# Patient Record
Sex: FEMALE | Race: WHITE | URBAN - METROPOLITAN AREA
[De-identification: names, ages, dates, MRNs, and addresses within clinical notes are randomized per-mention and may not be internally consistent; named-entity substitution may affect disease eponyms.]

---

## 2022-09-19 ENCOUNTER — WEB ENCOUNTER (OUTPATIENT)
Dept: URBAN - METROPOLITAN AREA CLINIC 96 | Facility: CLINIC | Age: 22
End: 2022-09-19

## 2022-09-19 ENCOUNTER — OFFICE VISIT (OUTPATIENT)
Dept: URBAN - METROPOLITAN AREA CLINIC 96 | Facility: CLINIC | Age: 22
End: 2022-09-19
Payer: COMMERCIAL

## 2022-09-19 VITALS
BODY MASS INDEX: 23.9 KG/M2 | HEART RATE: 89 BPM | SYSTOLIC BLOOD PRESSURE: 120 MMHG | HEIGHT: 64 IN | TEMPERATURE: 98.3 F | WEIGHT: 140 LBS | DIASTOLIC BLOOD PRESSURE: 71 MMHG

## 2022-09-19 DIAGNOSIS — K62.5 RECTAL BLEEDING: ICD-10-CM

## 2022-09-19 DIAGNOSIS — K21.9 GASTROESOPHAGEAL REFLUX DISEASE, UNSPECIFIED WHETHER ESOPHAGITIS PRESENT: ICD-10-CM

## 2022-09-19 DIAGNOSIS — R13.10 ODYNOPHAGIA: ICD-10-CM

## 2022-09-19 DIAGNOSIS — R14.0 BLOATING: ICD-10-CM

## 2022-09-19 DIAGNOSIS — K59.00 CONSTIPATION, UNSPECIFIED CONSTIPATION TYPE: ICD-10-CM

## 2022-09-19 DIAGNOSIS — R10.13 DYSPEPSIA: ICD-10-CM

## 2022-09-19 PROCEDURE — 99204 OFFICE O/P NEW MOD 45 MIN: CPT | Performed by: INTERNAL MEDICINE

## 2022-09-19 RX ORDER — ATENOLOL 25 MG/1
1 TABLET TABLET ORAL ONCE A DAY
Status: ACTIVE | COMMUNITY

## 2022-09-19 RX ORDER — TRAZODONE HYDROCHLORIDE 50 MG/1
1 TABLET AT BEDTIME AS NEEDED TABLET ORAL ONCE A DAY
Status: ACTIVE | COMMUNITY

## 2022-09-19 RX ORDER — NORTRIPTYLINE HYDROCHLORIDE 25 MG/1
1 CAPSULE CAPSULE ORAL TWICE A DAY
Status: ACTIVE | COMMUNITY

## 2022-09-19 RX ORDER — CELECOXIB 50 MG/1
1 CAPSULE WITH FOOD CAPSULE ORAL TWICE A DAY
Status: ACTIVE | COMMUNITY

## 2022-09-19 RX ORDER — OMEPRAZOLE 40 MG/1
1 CAPSULE 30 MINUTES BEFORE MORNING MEAL CAPSULE, DELAYED RELEASE ORAL ONCE A DAY
Qty: 90 | Refills: 1 | OUTPATIENT
Start: 2022-09-19

## 2022-09-19 RX ORDER — ESCITALOPRAM OXALATE 20 MG/1
1 TABLET TABLET, FILM COATED ORAL ONCE A DAY
Status: ACTIVE | COMMUNITY

## 2022-09-19 RX ORDER — PROPRANOLOL HYDROCHLORIDE 10 MG/1
1 TABLET TABLET ORAL ONCE A DAY
Status: ACTIVE | COMMUNITY

## 2022-09-19 RX ORDER — MIDODRINE HYDROCHLORIDE 2.5 MG/1
1 TABLET TABLET ORAL TWICE A DAY
Status: ACTIVE | COMMUNITY

## 2022-09-19 NOTE — PHYSICAL EXAM CONSTITUTIONAL:
well developed, well nourished , in no acute distress , ambulating with canes , normal communication ability

## 2022-09-19 NOTE — PHYSICAL EXAM HENT:
Head, normocephalic, atraumatic, Face, Face within normal limits, Ears, External ears within normal limits, good neck flexion and extension

## 2022-09-19 NOTE — HPI-TODAY'S VISIT:
23 yo female self referred for multiple GI sx including GERD and odynophagia.   Ehlors-Danlos, POTS, arthritis, cervical instability and occiptal neuralgia with future neck surgery being planned 10/5/2022. No prior EGD. No hx of PUD. No ASA or NSAIDs. No n/v. No CP or SOB.   Followed by rheumatologist. Patient reports GERD was diagnosed by her rheumatologist treated with Gaviscon--prn typically once weekly.  Does report odynophagia for the past 6 months.  Cannot eat cranberries--tongue will swell. No diet restrictions.   Not taking any other meds for GERD.   Also will have intermittent constipation. Will take Miralax prn perhaps 2-3 times monthly which will help. Had one episode of rectal bleeding 1 month ago. No further episodes. Also told she may have endometriosis, no exp lap was done. On empiric hormone therapy which has helped.  Baseline constipation with BM every 3 days.   Followed by therapist and psychiatrist for anxiety/depression. Suspected PTSD and ADHD per patient. No SI or HI.   Also being evaluated by immunology for immune deficiency.   Did receive COVID vaccine.

## 2022-09-28 ENCOUNTER — OFFICE VISIT (OUTPATIENT)
Dept: URBAN - METROPOLITAN AREA MEDICAL CENTER 28 | Facility: MEDICAL CENTER | Age: 22
End: 2022-09-28
Payer: COMMERCIAL

## 2022-09-28 DIAGNOSIS — R10.13 ABDOMINAL DISCOMFORT, EPIGASTRIC: ICD-10-CM

## 2022-09-28 DIAGNOSIS — K29.60 ADENOPAPILLOMATOSIS GASTRICA: ICD-10-CM

## 2022-09-28 DIAGNOSIS — K20.80 ESOPHAGITIS DISSECANS SUPERFICIALIS: ICD-10-CM

## 2022-09-28 DIAGNOSIS — K31.89 ACQUIRED DEFORMITY OF DUODENUM: ICD-10-CM

## 2022-09-28 PROCEDURE — 43239 EGD BIOPSY SINGLE/MULTIPLE: CPT | Performed by: INTERNAL MEDICINE

## 2022-10-12 ENCOUNTER — OFFICE VISIT (OUTPATIENT)
Dept: URBAN - METROPOLITAN AREA CLINIC 98 | Facility: CLINIC | Age: 22
End: 2022-10-12

## 2022-10-12 ENCOUNTER — TELEPHONE ENCOUNTER (OUTPATIENT)
Dept: URBAN - METROPOLITAN AREA CLINIC 96 | Facility: CLINIC | Age: 22
End: 2022-10-12

## 2022-10-12 RX ORDER — TRAZODONE HYDROCHLORIDE 50 MG/1
1 TABLET AT BEDTIME AS NEEDED TABLET ORAL ONCE A DAY
COMMUNITY

## 2022-10-12 RX ORDER — CELECOXIB 50 MG/1
1 CAPSULE WITH FOOD CAPSULE ORAL TWICE A DAY
COMMUNITY

## 2022-10-12 RX ORDER — MIDODRINE HYDROCHLORIDE 2.5 MG/1
1 TABLET TABLET ORAL TWICE A DAY
COMMUNITY

## 2022-10-12 RX ORDER — OMEPRAZOLE 40 MG/1
1 CAPSULE 30 MINUTES BEFORE MORNING MEAL CAPSULE, DELAYED RELEASE ORAL ONCE A DAY
Qty: 90 | Refills: 1 | COMMUNITY
Start: 2022-09-19

## 2022-10-12 RX ORDER — ATENOLOL 25 MG/1
1 TABLET TABLET ORAL ONCE A DAY
COMMUNITY

## 2022-10-12 RX ORDER — PROPRANOLOL HYDROCHLORIDE 10 MG/1
1 TABLET TABLET ORAL ONCE A DAY
COMMUNITY

## 2022-10-12 RX ORDER — ESCITALOPRAM OXALATE 20 MG/1
1 TABLET TABLET, FILM COATED ORAL ONCE A DAY
COMMUNITY

## 2022-10-12 RX ORDER — NORTRIPTYLINE HYDROCHLORIDE 25 MG/1
1 CAPSULE CAPSULE ORAL TWICE A DAY
COMMUNITY

## 2022-10-12 NOTE — HPI-TODAY'S VISIT:
Patient is a 22-year-old female who presents to follow-up from recent EGD.  EGD completed on 9/28/2022 with Dr. RODRIGUEZ at [facility].  Revealed normal third portion of the duodenum.  Normal duodenal bulb, first portion of the duodenum and second portion of the duodenum.  Erythematous mucosa in the stomach.  Normal lower third of the esophagus.  Pathology results revealed duodenal mucosa with normal limits.  Mild chronic inactive gastritis with no H. pylori from stomach biopsy.  Mild patchy chronic inflammation and reactive changes in the lower esophagus.  No evidence of eosinophilic esophagitis and negative for intestinal metaplasia or dysplasia.

## 2022-11-01 ENCOUNTER — OFFICE VISIT (OUTPATIENT)
Dept: URBAN - METROPOLITAN AREA CLINIC 96 | Facility: CLINIC | Age: 22
End: 2022-11-01

## 2022-11-10 ENCOUNTER — TELEPHONE ENCOUNTER (OUTPATIENT)
Dept: URBAN - METROPOLITAN AREA CLINIC 6 | Facility: CLINIC | Age: 22
End: 2022-11-10

## 2023-01-11 ENCOUNTER — WEB ENCOUNTER (OUTPATIENT)
Dept: URBAN - METROPOLITAN AREA CLINIC 96 | Facility: CLINIC | Age: 23
End: 2023-01-11

## 2023-01-11 ENCOUNTER — OFFICE VISIT (OUTPATIENT)
Dept: URBAN - METROPOLITAN AREA CLINIC 96 | Facility: CLINIC | Age: 23
End: 2023-01-11
Payer: COMMERCIAL

## 2023-01-11 VITALS
HEART RATE: 75 BPM | TEMPERATURE: 98.4 F | WEIGHT: 149 LBS | SYSTOLIC BLOOD PRESSURE: 105 MMHG | HEIGHT: 64 IN | BODY MASS INDEX: 25.44 KG/M2 | DIASTOLIC BLOOD PRESSURE: 76 MMHG

## 2023-01-11 DIAGNOSIS — R11.2 NAUSEA AND VOMITING, UNSPECIFIED VOMITING TYPE: ICD-10-CM

## 2023-01-11 DIAGNOSIS — K59.00 CONSTIPATION, UNSPECIFIED CONSTIPATION TYPE: ICD-10-CM

## 2023-01-11 DIAGNOSIS — K21.9 GASTROESOPHAGEAL REFLUX DISEASE, UNSPECIFIED WHETHER ESOPHAGITIS PRESENT: ICD-10-CM

## 2023-01-11 DIAGNOSIS — R13.10 ODYNOPHAGIA: ICD-10-CM

## 2023-01-11 DIAGNOSIS — R10.13 DYSPEPSIA: ICD-10-CM

## 2023-01-11 PROBLEM — 14760008: Status: ACTIVE | Noted: 2022-09-19

## 2023-01-11 PROBLEM — 235595009: Status: ACTIVE | Noted: 2022-09-19

## 2023-01-11 PROBLEM — 30233002: Status: ACTIVE | Noted: 2022-09-19

## 2023-01-11 PROCEDURE — 99214 OFFICE O/P EST MOD 30 MIN: CPT | Performed by: INTERNAL MEDICINE

## 2023-01-11 RX ORDER — MIDODRINE HYDROCHLORIDE 2.5 MG/1
1 TABLET TABLET ORAL TWICE A DAY
Status: ACTIVE | COMMUNITY

## 2023-01-11 RX ORDER — CELECOXIB 50 MG/1
1 CAPSULE WITH FOOD CAPSULE ORAL TWICE A DAY
Status: ACTIVE | COMMUNITY

## 2023-01-11 RX ORDER — NORTRIPTYLINE HYDROCHLORIDE 25 MG/1
1 CAPSULE CAPSULE ORAL TWICE A DAY
Status: ACTIVE | COMMUNITY

## 2023-01-11 RX ORDER — TRAZODONE HYDROCHLORIDE 50 MG/1
1 TABLET AT BEDTIME AS NEEDED TABLET ORAL ONCE A DAY
Status: ACTIVE | COMMUNITY

## 2023-01-11 RX ORDER — PROPRANOLOL HYDROCHLORIDE 10 MG/1
1 TABLET TABLET ORAL ONCE A DAY
Status: ACTIVE | COMMUNITY

## 2023-01-11 RX ORDER — OMEPRAZOLE 40 MG/1
1 CAPSULE 30 MINUTES BEFORE MORNING MEAL CAPSULE, DELAYED RELEASE ORAL ONCE A DAY
Qty: 90 | Refills: 1 | Status: ACTIVE | COMMUNITY
Start: 2022-09-19

## 2023-01-11 RX ORDER — ESCITALOPRAM OXALATE 20 MG/1
1 TABLET TABLET, FILM COATED ORAL ONCE A DAY
Status: ACTIVE | COMMUNITY

## 2023-01-11 RX ORDER — ATENOLOL 25 MG/1
1 TABLET TABLET ORAL ONCE A DAY
Status: ACTIVE | COMMUNITY

## 2023-01-11 NOTE — HPI-TODAY'S VISIT:
EGD done 9/28/2022 at [facility] with regular Z-line at 36 cm, gastric erythema.  Pathology with normal duodenal biopsies, gastric biopsies with mild chronic inactive gastritis negative for H. pylori.  Lower esophageal biopsies with mild chronic inflammation.  No intestinal metaplasia or eosinophilic esophagitis.  Patient reports was diagnosed with gluten sensitivity due to gluten in her allergy pill. Overall mych better.   Mast cell activation syndrome, diagnosed by her cardiologist who specializes in POTS. Treated with antihistamine and GI sx have markedly imroved with less nausea and less heartburn. No dysphagia, no odynophagia. Taking Allegra and Pepcid 20 mg BID. Still taking omeprazole 20 mg daily.   Still with lower abdominal pain, concern for lower abdominal pain and on treatment with OCP which has worsened pain.

## 2023-01-11 NOTE — HPI-TODAY'S VISIT:
23 yo female seen 9/19/2022 for multiple GI sx including GERD and odynophagia.   Ehlors-Danlos, POTS, arthritis, cervical instability and occiptal neuralgia with future neck surgery being planned 10/5/2022. No prior EGD. No hx of PUD. No ASA or NSAIDs. No n/v. No CP or SOB.   Followed by rheumatologist. Patient reports GERD was diagnosed by her rheumatologist treated with Gaviscon--prn typically once weekly.  Does report odynophagia for the past 6 months.  Cannot eat cranberries--tongue will swell. No diet restrictions.  Also will have intermittent constipation. Will take Miralax prn perhaps 2-3 times monthly which will help. Had one episode of rectal bleeding 1 month ago. No further episodes. Also told she may have endometriosis, no exp lap was done. On empiric hormone therapy which has helped.  Baseline constipation with BM every 3 days.   Followed by therapist and psychiatrist for anxiety/depression. Suspected PTSD and ADHD per patient. No SI or HI.   Also being evaluated by immunology for immune deficiency.

## 2023-01-11 NOTE — PHYSICAL EXAM CONSTITUTIONAL:
well developed, well nourished , in no acute distress , ambulating with canes , normal communication ability Patient

## 2023-01-13 NOTE — PHYSICAL EXAM GASTROINTESTINAL
Abdomen , soft, nontender, nondistended , no guarding or rigidity , no masses palpable , normal bowel sounds , Liver and Spleen,  no hepatosplenomegaly , liver nontender Detail Level: Detailed

## 2023-03-30 ENCOUNTER — WEB ENCOUNTER (OUTPATIENT)
Dept: URBAN - METROPOLITAN AREA CLINIC 96 | Facility: CLINIC | Age: 23
End: 2023-03-30

## 2023-04-26 ENCOUNTER — OFFICE VISIT (OUTPATIENT)
Dept: URBAN - METROPOLITAN AREA CLINIC 96 | Facility: CLINIC | Age: 23
End: 2023-04-26
Payer: COMMERCIAL

## 2023-04-26 ENCOUNTER — LAB OUTSIDE AN ENCOUNTER (OUTPATIENT)
Dept: URBAN - METROPOLITAN AREA CLINIC 96 | Facility: CLINIC | Age: 23
End: 2023-04-26

## 2023-04-26 VITALS
WEIGHT: 153 LBS | SYSTOLIC BLOOD PRESSURE: 109 MMHG | HEIGHT: 64 IN | TEMPERATURE: 98.2 F | DIASTOLIC BLOOD PRESSURE: 80 MMHG | BODY MASS INDEX: 26.12 KG/M2

## 2023-04-26 DIAGNOSIS — R13.10 ODYNOPHAGIA: ICD-10-CM

## 2023-04-26 DIAGNOSIS — R11.2 NAUSEA AND VOMITING, UNSPECIFIED VOMITING TYPE: ICD-10-CM

## 2023-04-26 DIAGNOSIS — K59.00 CONSTIPATION, UNSPECIFIED CONSTIPATION TYPE: ICD-10-CM

## 2023-04-26 DIAGNOSIS — R10.13 DYSPEPSIA: ICD-10-CM

## 2023-04-26 DIAGNOSIS — K21.9 GASTROESOPHAGEAL REFLUX DISEASE, UNSPECIFIED WHETHER ESOPHAGITIS PRESENT: ICD-10-CM

## 2023-04-26 DIAGNOSIS — R68.81 EARLY SATIETY: ICD-10-CM

## 2023-04-26 DIAGNOSIS — R14.0 BLOATING: ICD-10-CM

## 2023-04-26 PROCEDURE — 99214 OFFICE O/P EST MOD 30 MIN: CPT | Performed by: INTERNAL MEDICINE

## 2023-04-26 RX ORDER — TRAZODONE HYDROCHLORIDE 50 MG/1
1 TABLET AT BEDTIME AS NEEDED TABLET ORAL ONCE A DAY
Status: ACTIVE | COMMUNITY

## 2023-04-26 RX ORDER — NORTRIPTYLINE HYDROCHLORIDE 25 MG/1
1 CAPSULE CAPSULE ORAL TWICE A DAY
Status: ACTIVE | COMMUNITY

## 2023-04-26 RX ORDER — PROPRANOLOL HYDROCHLORIDE 10 MG/1
1 TABLET TABLET ORAL ONCE A DAY
Status: ACTIVE | COMMUNITY

## 2023-04-26 RX ORDER — OMEPRAZOLE 40 MG/1
1 CAPSULE 30 MINUTES BEFORE MORNING MEAL CAPSULE, DELAYED RELEASE ORAL ONCE A DAY
Qty: 90 | Refills: 1 | Status: ACTIVE | COMMUNITY
Start: 2022-09-19

## 2023-04-26 RX ORDER — ATENOLOL 25 MG/1
1 TABLET TABLET ORAL ONCE A DAY
Status: ACTIVE | COMMUNITY

## 2023-04-26 RX ORDER — CELECOXIB 50 MG/1
1 CAPSULE WITH FOOD CAPSULE ORAL TWICE A DAY
Status: ACTIVE | COMMUNITY

## 2023-04-26 RX ORDER — ESCITALOPRAM OXALATE 20 MG/1
1 TABLET TABLET, FILM COATED ORAL ONCE A DAY
Status: ACTIVE | COMMUNITY

## 2023-04-26 RX ORDER — MIDODRINE HYDROCHLORIDE 2.5 MG/1
1 TABLET TABLET ORAL TWICE A DAY
Status: ACTIVE | COMMUNITY

## 2023-04-26 NOTE — HPI-TODAY'S VISIT:
21 yo female seen 1/11/2023 for multiple GI sx including GERD and odynophagia.   Ehlors-Danlos, POTS, arthritis, cervical instability and occiptal neuralgia with future neck surgery being planned 10/5/2022. No prior EGD. No hx of PUD. No ASA or NSAIDs. No n/v. No CP or SOB.   Followed by rheumatologist. Patient reports GERD was diagnosed by her rheumatologist treated with Gaviscon--prn typically once weekly.    Intermittent constipation. Will take Miralax prn perhaps 2-3 times monthly which will help. Had one episode of rectal bleeding 1 month ago. No further episodes. Also told she may have endometriosis, no exp lap was done. On empiric hormone therapy which has helped.  Baseline constipation with BM every 3 days.   Followed by therapist and psychiatrist for anxiety/depression. Suspected PTSD and ADHD per patient. No SI or HI.   Also being evaluated by immunology for immune deficiency.

## 2023-04-26 NOTE — HPI-TODAY'S VISIT:
EGD done 9/28/2022 at [facility] with regular Z-line at 36 cm, gastric erythema.  Pathology with normal duodenal biopsies, gastric biopsies with mild chronic inactive gastritis negative for H. pylori.  Lower esophageal biopsies with mild chronic inflammation.  No intestinal metaplasia or eosinophilic esophagitis.  Patient reports was diagnosed with gluten sensitivity due to gluten in her allergy pill. Overall much better.   Mast cell activation syndrome, diagnosed by her cardiologist who specializes in POTS. Treated with antihistamine and GI sx have markedly imroved with less nausea and less heartburn. No dysphagia, no odynophagia. Taking Allegra and Pepcid 20 mg BID. Still taking omeprazole 20 mg daily.   Still with lower abdominal pain, concern for lower abdominal pain and on treatment with OCP which has worsened pain.  Patient reports another episode of vomiting requiring IV fluids one month ago. No hematemesis, no coffee ground emesis. After ate oatmeal. CT was done at [facility] normal per patient. To see interventional radiologist for "iliac vein collapse". Now allergic for gluten, to have future testing to eval for such.  Reports recent stressors which worsened constipation and better with Miralax.   No prior gastric emptying study. Does report early satiety worse with constipation. Nausea with eating. Taking Zofran regularly.

## 2023-04-28 ENCOUNTER — OFFICE VISIT (OUTPATIENT)
Dept: URBAN - METROPOLITAN AREA CLINIC 96 | Facility: CLINIC | Age: 23
End: 2023-04-28

## 2023-04-30 ENCOUNTER — WEB ENCOUNTER (OUTPATIENT)
Dept: URBAN - METROPOLITAN AREA CLINIC 96 | Facility: CLINIC | Age: 23
End: 2023-04-30

## 2023-05-03 ENCOUNTER — WEB ENCOUNTER (OUTPATIENT)
Dept: URBAN - METROPOLITAN AREA CLINIC 96 | Facility: CLINIC | Age: 23
End: 2023-05-03

## 2023-05-15 ENCOUNTER — LAB OUTSIDE AN ENCOUNTER (OUTPATIENT)
Dept: URBAN - METROPOLITAN AREA CLINIC 96 | Facility: CLINIC | Age: 23
End: 2023-05-15

## 2023-06-27 ENCOUNTER — OFFICE VISIT (OUTPATIENT)
Dept: URBAN - METROPOLITAN AREA CLINIC 96 | Facility: CLINIC | Age: 23
End: 2023-06-27
Payer: COMMERCIAL

## 2023-06-27 ENCOUNTER — LAB OUTSIDE AN ENCOUNTER (OUTPATIENT)
Dept: URBAN - METROPOLITAN AREA CLINIC 96 | Facility: CLINIC | Age: 23
End: 2023-06-27

## 2023-06-27 VITALS
SYSTOLIC BLOOD PRESSURE: 103 MMHG | TEMPERATURE: 97.8 F | HEIGHT: 64 IN | WEIGHT: 158 LBS | DIASTOLIC BLOOD PRESSURE: 71 MMHG | BODY MASS INDEX: 26.98 KG/M2

## 2023-06-27 DIAGNOSIS — R10.13 DYSPEPSIA: ICD-10-CM

## 2023-06-27 DIAGNOSIS — K62.5 RECTAL BLEEDING: ICD-10-CM

## 2023-06-27 DIAGNOSIS — K21.9 ACID REFLUX: ICD-10-CM

## 2023-06-27 DIAGNOSIS — K59.01 CONSTIPATION: ICD-10-CM

## 2023-06-27 PROCEDURE — 99214 OFFICE O/P EST MOD 30 MIN: CPT | Performed by: INTERNAL MEDICINE

## 2023-06-27 RX ORDER — MIDODRINE HYDROCHLORIDE 2.5 MG/1
1 TABLET TABLET ORAL TWICE A DAY
Status: ACTIVE | COMMUNITY

## 2023-06-27 RX ORDER — NORTRIPTYLINE HYDROCHLORIDE 25 MG/1
1 CAPSULE CAPSULE ORAL TWICE A DAY
Status: ACTIVE | COMMUNITY

## 2023-06-27 RX ORDER — SUCRALFATE 1 G/1
1 TABLET ON AN EMPTY STOMACH TABLET ORAL TWICE A DAY
Qty: 60 | Refills: 3 | OUTPATIENT
Start: 2023-06-27 | End: 2023-10-25

## 2023-06-27 RX ORDER — CELECOXIB 50 MG/1
1 CAPSULE WITH FOOD CAPSULE ORAL TWICE A DAY
Status: ACTIVE | COMMUNITY

## 2023-06-27 RX ORDER — PROPRANOLOL HYDROCHLORIDE 10 MG/1
1 TABLET TABLET ORAL ONCE A DAY
Status: ACTIVE | COMMUNITY

## 2023-06-27 RX ORDER — HYDROCORTISONE ACETATE 25 MG/1
1 SUPPOSITORY SUPPOSITORY RECTAL ONCE A DAY
Qty: 10 | Refills: 0 | OUTPATIENT
Start: 2023-06-27 | End: 2023-07-07

## 2023-06-27 RX ORDER — ATENOLOL 25 MG/1
1 TABLET TABLET ORAL ONCE A DAY
Status: ACTIVE | COMMUNITY

## 2023-06-27 RX ORDER — TRAZODONE HYDROCHLORIDE 50 MG/1
1 TABLET AT BEDTIME AS NEEDED TABLET ORAL ONCE A DAY
Status: ACTIVE | COMMUNITY

## 2023-06-27 RX ORDER — OMEPRAZOLE 40 MG/1
1 CAPSULE 30 MINUTES BEFORE MORNING MEAL CAPSULE, DELAYED RELEASE ORAL ONCE A DAY
Qty: 90 | Refills: 1 | Status: ACTIVE | COMMUNITY
Start: 2022-09-19

## 2023-06-27 RX ORDER — ESCITALOPRAM OXALATE 20 MG/1
1 TABLET TABLET, FILM COATED ORAL ONCE A DAY
Status: ACTIVE | COMMUNITY

## 2023-06-27 RX ORDER — SODIUM, POTASSIUM,MAG SULFATES 17.5-3.13G
177 ML SOLUTION, RECONSTITUTED, ORAL ORAL
Qty: 1 KIT | Refills: 0 | OUTPATIENT
Start: 2023-06-27 | End: 2023-06-28

## 2023-06-27 RX ORDER — ONDANSETRON 4 MG/1
1 TABLET ON THE TONGUE AND ALLOW TO DISSOLVE TABLET, ORALLY DISINTEGRATING ORAL
Qty: 30 | Refills: 1 | OUTPATIENT
Start: 2023-06-27

## 2023-06-27 NOTE — HPI-TODAY'S VISIT:
23 yo female seen 1/11/2023 for multiple GI sx including GERD and odynophagia.   Ehlors-Danlos, POTS, arthritis, cervical instability and occiptal neuralgia with future neck surgery being planned 10/5/2022. No prior EGD. No hx of PUD. No ASA or NSAIDs. No n/v. No CP or SOB.   Followed by rheumatologist. Patient reports GERD was diagnosed by her rheumatologist treated with Gaviscon--prn typically once weekly.    Intermittent constipation. Will take Miralax prn perhaps 2-3 times monthly which will help. Had one episode of rectal bleeding 1 month ago. No further episodes. Also told she may have endometriosis, no exp lap was done. On empiric hormone therapy which has helped.  Baseline constipation with BM every 3 days.   Followed by therapist and psychiatrist for anxiety/depression. Suspected PTSD and ADHD per patient. No SI or HI.   Also being evaluated by immunology for immune deficiency.

## 2023-07-05 ENCOUNTER — TELEPHONE ENCOUNTER (OUTPATIENT)
Dept: URBAN - METROPOLITAN AREA CLINIC 96 | Facility: CLINIC | Age: 23
End: 2023-07-05

## 2023-07-06 ENCOUNTER — LAB OUTSIDE AN ENCOUNTER (OUTPATIENT)
Dept: URBAN - METROPOLITAN AREA CLINIC 96 | Facility: CLINIC | Age: 23
End: 2023-07-06

## 2023-08-14 ENCOUNTER — LAB OUTSIDE AN ENCOUNTER (OUTPATIENT)
Dept: URBAN - METROPOLITAN AREA CLINIC 96 | Facility: CLINIC | Age: 23
End: 2023-08-14

## 2023-08-14 ENCOUNTER — OFFICE VISIT (OUTPATIENT)
Dept: URBAN - METROPOLITAN AREA MEDICAL CENTER 28 | Facility: MEDICAL CENTER | Age: 23
End: 2023-08-14
Payer: COMMERCIAL

## 2023-08-14 DIAGNOSIS — D50.9 ANEMIA: ICD-10-CM

## 2023-08-14 DIAGNOSIS — K59.09 CHANGE IN BOWEL MOVEMENTS INTERMITTENT CONSTIPATION. URGENCY IN THE MORNING.: ICD-10-CM

## 2023-08-14 DIAGNOSIS — K62.5 ANAL BLEEDING: ICD-10-CM

## 2023-08-14 PROCEDURE — 45378 DIAGNOSTIC COLONOSCOPY: CPT | Performed by: INTERNAL MEDICINE

## 2023-08-14 RX ORDER — ONDANSETRON 4 MG/1
1 TABLET ON THE TONGUE AND ALLOW TO DISSOLVE TABLET, ORALLY DISINTEGRATING ORAL
Qty: 30 | Refills: 1 | Status: ACTIVE | COMMUNITY
Start: 2023-06-27

## 2023-08-14 RX ORDER — PROPRANOLOL HYDROCHLORIDE 10 MG/1
1 TABLET TABLET ORAL ONCE A DAY
Status: ACTIVE | COMMUNITY

## 2023-08-14 RX ORDER — ATENOLOL 25 MG/1
1 TABLET TABLET ORAL ONCE A DAY
Status: ACTIVE | COMMUNITY

## 2023-08-14 RX ORDER — TRAZODONE HYDROCHLORIDE 50 MG/1
1 TABLET AT BEDTIME AS NEEDED TABLET ORAL ONCE A DAY
Status: ACTIVE | COMMUNITY

## 2023-08-14 RX ORDER — ESCITALOPRAM OXALATE 20 MG/1
1 TABLET TABLET, FILM COATED ORAL ONCE A DAY
Status: ACTIVE | COMMUNITY

## 2023-08-14 RX ORDER — CELECOXIB 50 MG/1
1 CAPSULE WITH FOOD CAPSULE ORAL TWICE A DAY
Status: ACTIVE | COMMUNITY

## 2023-08-14 RX ORDER — NORTRIPTYLINE HYDROCHLORIDE 25 MG/1
1 CAPSULE CAPSULE ORAL TWICE A DAY
Status: ACTIVE | COMMUNITY

## 2023-08-14 RX ORDER — MIDODRINE HYDROCHLORIDE 2.5 MG/1
1 TABLET TABLET ORAL TWICE A DAY
Status: ACTIVE | COMMUNITY

## 2023-08-14 RX ORDER — SUCRALFATE 1 G/1
1 TABLET ON AN EMPTY STOMACH TABLET ORAL TWICE A DAY
Qty: 60 | Refills: 3 | Status: ACTIVE | COMMUNITY
Start: 2023-06-27 | End: 2023-10-25

## 2023-08-14 RX ORDER — OMEPRAZOLE 40 MG/1
1 CAPSULE 30 MINUTES BEFORE MORNING MEAL CAPSULE, DELAYED RELEASE ORAL ONCE A DAY
Qty: 90 | Refills: 1 | Status: ACTIVE | COMMUNITY
Start: 2022-09-19

## 2023-09-26 ENCOUNTER — WEB ENCOUNTER (OUTPATIENT)
Dept: URBAN - METROPOLITAN AREA CLINIC 96 | Facility: CLINIC | Age: 23
End: 2023-09-26

## 2024-03-12 ENCOUNTER — OV EP (OUTPATIENT)
Dept: URBAN - METROPOLITAN AREA CLINIC 96 | Facility: CLINIC | Age: 24
End: 2024-03-12

## 2024-03-12 RX ORDER — NORTRIPTYLINE HYDROCHLORIDE 25 MG/1
1 CAPSULE CAPSULE ORAL TWICE A DAY
Status: ACTIVE | COMMUNITY

## 2024-03-12 RX ORDER — ONDANSETRON 4 MG/1
1 TABLET ON THE TONGUE AND ALLOW TO DISSOLVE TABLET, ORALLY DISINTEGRATING ORAL
Qty: 30 | Refills: 1 | OUTPATIENT

## 2024-03-12 RX ORDER — ATENOLOL 25 MG/1
1 TABLET TABLET ORAL ONCE A DAY
Status: ACTIVE | COMMUNITY

## 2024-03-12 RX ORDER — OMEPRAZOLE 40 MG/1
1 CAPSULE 30 MINUTES BEFORE MORNING MEAL CAPSULE, DELAYED RELEASE ORAL ONCE A DAY
Qty: 90 | Refills: 1 | Status: ACTIVE | COMMUNITY
Start: 2022-09-19

## 2024-03-12 RX ORDER — ONDANSETRON 4 MG/1
1 TABLET ON THE TONGUE AND ALLOW TO DISSOLVE TABLET, ORALLY DISINTEGRATING ORAL
Qty: 30 | Refills: 1 | Status: ACTIVE | COMMUNITY
Start: 2023-06-27

## 2024-03-12 RX ORDER — CELECOXIB 50 MG/1
1 CAPSULE WITH FOOD CAPSULE ORAL TWICE A DAY
Status: ACTIVE | COMMUNITY

## 2024-03-12 RX ORDER — TRAZODONE HYDROCHLORIDE 50 MG/1
1 TABLET AT BEDTIME AS NEEDED TABLET ORAL ONCE A DAY
Status: ACTIVE | COMMUNITY

## 2024-03-12 RX ORDER — MIDODRINE HYDROCHLORIDE 2.5 MG/1
1 TABLET TABLET ORAL TWICE A DAY
Status: ACTIVE | COMMUNITY

## 2024-03-12 RX ORDER — ESCITALOPRAM OXALATE 20 MG/1
1 TABLET TABLET, FILM COATED ORAL ONCE A DAY
Status: ACTIVE | COMMUNITY

## 2024-03-12 RX ORDER — PROPRANOLOL HYDROCHLORIDE 10 MG/1
1 TABLET TABLET ORAL ONCE A DAY
Status: ACTIVE | COMMUNITY

## 2024-03-12 NOTE — HPI-TODAY'S VISIT:
EGD done 9/28/2022 at [facility] with regular Z-line at 36 cm, gastric erythema.  Pathology with normal duodenal biopsies, gastric biopsies with mild chronic inactive gastritis negative for H. pylori.  Lower esophageal biopsies with mild chronic inflammation.  No intestinal metaplasia or eosinophilic esophagitis.  Patient reports was diagnosed with gluten sensitivity due to gluten in her allergy pill. Overall much better.   Mast cell activation syndrome, diagnosed by her cardiologist who specializes in POTS. Treated with antihistamine and GI sx have markedly imroved with less nausea and less heartburn. No dysphagia, no odynophagia. Taking Allegra and Pepcid 20 mg BID. Still taking omeprazole 20 mg daily.   Still with lower abdominal pain, concern for lower abdominal pain and on treatment with OCP which has worsened pain.  Patient reports another episode of vomiting requiring IV fluids one month ago. No hematemesis, no coffee ground emesis. After ate oatmeal. CT was done at [facility] normal per patient. To see interventional radiologist for "iliac vein collapse". Now allergic for gluten, to have future testing to eval for such.  Reports recent stressors which worsened constipation and better with Miralax.   No prior gastric emptying study. Does report early satiety worse with constipation. Nausea with eating. Taking Zofran regularly. Patient was more recently seen by ABBY Forrest 6/27/2023 for constipation and rectal bleeding.  Linzess samples 1 4 5 mcg provided to trial.  Gastric emptying study findings was reviewed which was negative.  Patient underwent colonoscopy at [facility] performed by myself 8/14/2023.  Endoscopically normal to the terminal ileum with internal hemorrhoids noted on retroflexion, mild.

## 2024-03-12 NOTE — HPI-TODAY'S VISIT:
23 yo female seen 4/26/2023 by myself multiple GI sx including GERD and odynophagia.   Ehlors-Danlos, POTS, arthritis, cervical instability and occiptal neuralgia with future neck surgery being planned 10/5/2022. No prior EGD. No hx of PUD. No ASA or NSAIDs. No n/v. No CP or SOB.   Followed by rheumatologist. Patient reports GERD was diagnosed by her rheumatologist treated with Gaviscon--prn typically once weekly.    Intermittent constipation. Will take Miralax prn perhaps 2-3 times monthly which will help. Had one episode of rectal bleeding 1 month ago. No further episodes. Also told she may have endometriosis, no exp lap was done. On empiric hormone therapy which has helped.  Baseline constipation with BM every 3 days.   Followed by therapist and psychiatrist for anxiety/depression. Suspected PTSD and ADHD per patient. No SI or HI.   Also being evaluated by immunology for immune deficiency.